# Patient Record
Sex: FEMALE | Race: WHITE | NOT HISPANIC OR LATINO | Employment: UNEMPLOYED | URBAN - METROPOLITAN AREA
[De-identification: names, ages, dates, MRNs, and addresses within clinical notes are randomized per-mention and may not be internally consistent; named-entity substitution may affect disease eponyms.]

---

## 2017-12-04 ENCOUNTER — HOSPITAL ENCOUNTER (OUTPATIENT)
Facility: MEDICAL CENTER | Age: 22
End: 2017-12-04
Attending: OBSTETRICS & GYNECOLOGY | Admitting: OBSTETRICS & GYNECOLOGY
Payer: OTHER GOVERNMENT

## 2017-12-04 VITALS
RESPIRATION RATE: 16 BRPM | TEMPERATURE: 98.2 F | SYSTOLIC BLOOD PRESSURE: 125 MMHG | DIASTOLIC BLOOD PRESSURE: 81 MMHG | HEART RATE: 105 BPM

## 2017-12-04 LAB
APPEARANCE UR: CLEAR
COLOR UR AUTO: YELLOW
GLUCOSE UR QL STRIP.AUTO: NEGATIVE MG/DL
KETONES UR QL STRIP.AUTO: NEGATIVE MG/DL
LEUKOCYTE ESTERASE UR QL STRIP.AUTO: ABNORMAL
NITRITE UR QL STRIP.AUTO: NEGATIVE
PH UR STRIP.AUTO: 6 [PH]
PROT UR QL STRIP: NEGATIVE MG/DL
RBC UR QL AUTO: NEGATIVE
SP GR UR: 1.01

## 2017-12-04 PROCEDURE — 59025 FETAL NON-STRESS TEST: CPT | Performed by: OBSTETRICS & GYNECOLOGY

## 2017-12-04 PROCEDURE — 81002 URINALYSIS NONAUTO W/O SCOPE: CPT

## 2017-12-04 NOTE — PROGRESS NOTES
3726-pt presents from home with c/o cramping today, states that she has had cramps the whole pregnancy, but today they are worse and she has been unable to transfer care from her OB in [place], no c/o leaking, bleeding or other pain, states baby is moving normally, placed on external monitors, vs taken, ua dipped with moderate luekocytes, called her previous office in [place], requested PNR, pt states she is IUGR, pt denies burning with urination, states she has frequency and urgency  1040-TC Dr Torres, report given, no uc's noted on monitors,   1042-Dr Torres here, assessment done, SVE closed, will write Rx for UTI, pt to follow up at [facility], discharge order received  3488-pt discharged home with time for appt at [facility], also gave PTL instructions and Rx for Macrobid, verbalized understanding, left ambulatory on her own

## 2017-12-04 NOTE — PROGRESS NOTES
LABOR AND DELIVERY EVALUATION/CONSULTATION;    Chief complaint;  Lorene Albert is a 22 y.o.  female  at 31 weeks of pregnancy. Presents complaining of lower abdominal crampy pain. Patient is to transfer of care from [facility] patient denies contractions or leakage of fluid. Patient's had 2 previous UTIs. The patient has had  full OB care-she states she is a high-risk pregnancy with IUGR.    Pregnancy complicated by;  Patient Active Problem List    Diagnosis Date Noted   • Postpartum care following vaginal delivery 2015   • ADHD (attention deficit hyperactivity disorder) 2012       Review of systems-denies; fever, chills, shortness of breath, chest pain, abdominal pain    Past medical history-  Past Medical History:   Diagnosis Date   • ADHD (attention deficit hyperactivity disorder) 2012   • Heart murmur    • marijuana use in pregnancy 2015   • Supervision of normal first teen pregnancy 2015       Past Surgical history-  Past Surgical History:   Procedure Laterality Date   • OTHER      Open heart surgery       Present medications- No current facility-administered medications for this encounter.   OB history-  OB History    Para Term  AB Living   1         1   SAB TAB Ectopic Molar Multiple Live Births             1      # Outcome Date GA Lbr Avery/2nd Weight Sex Delivery Anes PTL Lv   1 Term 08/14/15 41w4d  3.056 kg (6 lb 11.8 oz) M Vag-Spont EPI N LINNETTE      Birth Comments: NBS x2 normal, passed NB hearing screen. Received Hep B vaccine at birth        Social history-  Social History     Social History   • Marital status:      Spouse name: N/A   • Number of children: N/A   • Years of education: N/A     Occupational History   • Not on file.     Social History Main Topics   • Smoking status: Never Smoker   • Smokeless tobacco: Not on file   • Alcohol use No   • Drug use: No      Comment: Weed last time used 1/15/2015   • Sexual activity: Yes      Partners: Male     Other Topics Concern   • Not on file     Social History Narrative   • No narrative on file     Allergies-Patient has no known allergies.  Family History- no history of breast or ovarian cancer    Physical examination;  Alert and oriented x3  Gen.-well-developed well-nourished female in no apparent distress  There were no vitals filed for this visit.  Skin-warm and dry  Throat-no erythema or exudates  HEENT-normocephalic,nontraumatic,PERRLA,EOMI  Neck-supple, no masses  Cardiovascular-regular rate rhythm, normal S1-S2, no murmurs  Lungs-clear to auscultation bilaterally  Back-no CVA tenderness  Abdomen-nondistended positive bowel sounds soft nontender without masses or hepatosplenomegaly  Pelvic examination;  Cervix-long and closed  Extremities-no cyanosis clubbing or edema  Neurologic grossly intact    Labs;  No results for input(s): WBC, RBC, HEMOGLOBIN, HEMATOCRIT, MCV, MCH, RDW, PLATELETCT, MPV, NEUTSPOLYS, LYMPHOCYTES, MONOCYTES, EOSINOPHILS, BASOPHILS, RBCMORPHOLO in the last 72 hours.  No results for input(s): SODIUM, POTASSIUM, CHLORIDE, CO2, GLUCOSE, BUN, CPKTOTAL in the last 72 hours.        Urine dip shows moderate leukocyte esterase        NST; as performed and read by myself;reactive NST     Impression;  Intrauterine pregnancy at 41w4d  UTI    Plan;  Macrobid 100 mg by mouth twice a day  Patient needs transfer of care to our OB clinic as soon as possible     labor precautions given    Rock Torres MD

## 2017-12-26 ENCOUNTER — HOSPITAL ENCOUNTER (OUTPATIENT)
Facility: MEDICAL CENTER | Age: 22
End: 2017-12-26
Attending: OBSTETRICS & GYNECOLOGY | Admitting: OBSTETRICS & GYNECOLOGY
Payer: OTHER GOVERNMENT

## 2017-12-26 ENCOUNTER — APPOINTMENT (OUTPATIENT)
Dept: RADIOLOGY | Facility: MEDICAL CENTER | Age: 22
End: 2017-12-26
Attending: STUDENT IN AN ORGANIZED HEALTH CARE EDUCATION/TRAINING PROGRAM
Payer: OTHER GOVERNMENT

## 2017-12-26 VITALS
WEIGHT: 172 LBS | TEMPERATURE: 97.9 F | SYSTOLIC BLOOD PRESSURE: 118 MMHG | BODY MASS INDEX: 29.52 KG/M2 | HEART RATE: 103 BPM | DIASTOLIC BLOOD PRESSURE: 70 MMHG

## 2017-12-26 PROCEDURE — 59025 FETAL NON-STRESS TEST: CPT | Performed by: OBSTETRICS & GYNECOLOGY

## 2017-12-26 PROCEDURE — 76816 OB US FOLLOW-UP PER FETUS: CPT

## 2017-12-26 RX ORDER — ONDANSETRON HYDROCHLORIDE 8 MG/1
8 TABLET, FILM COATED ORAL EVERY 12 HOURS PRN
Qty: 30 TAB | Refills: 1 | Status: SHIPPED | OUTPATIENT
Start: 2017-12-26

## 2017-12-26 NOTE — PROGRESS NOTES
LABOR AND DELIVERY TRIAGE PROGRESS NOTE    PATIENT ID:  NAME:  Lorene Albert  MRN:               [id number]  YOB: 1995     22 y.o. female  at 34w1d per patient's due date. She had most of her prenatal care in [place], moved to [place] about a month ago because her  in the  will be deployed for 9 months soon and patient will be living with her mother. She has an appt scheduled 18 at 0830 with [facility].    Subjective: Pt needed a refill on Zofran and was worried about size of the baby as she was supposed to be receiving U/S every 2 weeks for IUGR.   Pregnancy complicated by IUGR.     negative  For CTXS.   negative Feels pain   negative for LOF  negative for vaginal bleeding.   positive for fetal movement    ROS: Patient denies any fever chills, nausea, vomiting, headache, chest pain, shortness of breath, or dysuria or unusual swelling of hands or feet.     Objective:    Vitals:    17 1100   Weight: 78 kg (172 lb)     No data recorded.    General: No acute distress, resting comfortably in bed.  HEENT: normocephalic, nontraumatic, PERRLA, EOMI  Cardiovascular: Heart RRR with no murmurs, rubs or gallops. Distal Pulses 2+  Respiratory: symmetric chest expansion, lungs CTAB, with no wheezes, rales, rhonci  Abdomen: gravid, nontender  Musculoskeletal: strength 5/5 in four extremities  Neuro: non focal with no numbness, tingling or changes in sensation    Cervix:  deferred  Hersey: Uterine Contractions none  FHRM: Baseline 130,some accels, no decels, moderate variability  Fundal Height: 30cm  Growth U/S consistent with pregnancy at 31w3d (3 weeks behind), SID 6.2cm     Assessment: 22 y.o. female  at 34w1d who is not in labor.  CAT 1 FHT  IUGR with low amniotic fluid (SID 6.2cm)    Plan:   1. Return for NST and SID  @ 0900  2. Rx for PO Zofran 8mg q12  3. Discharge home with return precautions and instructions to follow up with [facility] as scheduled on 18      Discussed case  with Dr. Washburn TPGLORIA Attending. Case was discussed and attending agreed with plan prior to discharge of patient.

## 2017-12-26 NOTE — PROGRESS NOTES
1044) Pt is a  at 34.1 weeks with Prenatal care in [place].  Pt transferred to [place] due to  being in the ,  She has an appointment with [facility] 2018.  Pt states that she has IUGR and wanted to check in on the pregnancy since she has not had care for 1 month.  EFM applied, good fetal movement noted.  Pt denies uc's or leaking of fluid.    1100) Dr Gabriel notified, MD will be in to assess.    1105) Dr Gabriel at bedside, pt assessed, orders received  1140) records obtained from [place].  Pt given prescription for Zofran  1315) ultrasound results reviewed with Dr Baig, strip reviewed by MD.  Order received to schedule pt for NST and SID 2017 @ 0900.    1325) Discussed POC with pt, pt agrees.  Discharge instructions given for  labor and kick counts.  Pt will go home and rest frequently.  Pt verbalizes understanding with questions answered.  stabel discharged to home

## 2017-12-27 ENCOUNTER — HOSPITAL ENCOUNTER (OUTPATIENT)
Facility: MEDICAL CENTER | Age: 22
End: 2017-12-27
Attending: OBSTETRICS & GYNECOLOGY | Admitting: OBSTETRICS & GYNECOLOGY
Payer: OTHER GOVERNMENT

## 2017-12-27 ENCOUNTER — HOSPITAL ENCOUNTER (OUTPATIENT)
Facility: MEDICAL CENTER | Age: 22
End: 2017-12-27
Payer: OTHER GOVERNMENT

## 2017-12-27 VITALS — HEART RATE: 104 BPM | SYSTOLIC BLOOD PRESSURE: 121 MMHG | DIASTOLIC BLOOD PRESSURE: 76 MMHG

## 2017-12-27 LAB — A1 MICROGLOB PLACENTAL VAG QL: NEGATIVE

## 2017-12-27 PROCEDURE — 84112 EVAL AMNIOTIC FLUID PROTEIN: CPT

## 2017-12-27 PROCEDURE — 59025 FETAL NON-STRESS TEST: CPT | Performed by: OBSTETRICS & GYNECOLOGY

## 2017-12-27 NOTE — PROGRESS NOTES
22 y.o.    Edc=2/ 34.2 weeks    TOCO and US on.  VSS.  Pt presents to triage with c/o LOF.  Pt states she was here yesterday and was told her fluid was low after an US was completed.  She states the Dr. Told her that if she noticed any LOF that she should return to the hospital.  Pt denies VB and states pos FM. Pt states she does feel UCs occasionally. Orders to obtain amnisure.  1530-result of amnisure=negative, reactive NST, no UCs seen on monitor.  Discussed findings with Dr. Gant.  Orders to dc pt home with general precautions and follow up instructions for NST on Saturday.  Pt states understanding

## 2017-12-30 ENCOUNTER — HOSPITAL ENCOUNTER (OUTPATIENT)
Facility: MEDICAL CENTER | Age: 22
End: 2017-12-30
Attending: OBSTETRICS & GYNECOLOGY | Admitting: OBSTETRICS & GYNECOLOGY
Payer: OTHER GOVERNMENT

## 2017-12-30 VITALS
DIASTOLIC BLOOD PRESSURE: 76 MMHG | RESPIRATION RATE: 16 BRPM | HEART RATE: 98 BPM | TEMPERATURE: 98.4 F | SYSTOLIC BLOOD PRESSURE: 118 MMHG

## 2017-12-30 PROCEDURE — 59025 FETAL NON-STRESS TEST: CPT | Performed by: OBSTETRICS & GYNECOLOGY

## 2017-12-30 NOTE — PROGRESS NOTES
0810-pt presents from home for scheduled NST for oligo and IUGR, no c/o leaking, bleeding, pain or uc's, states baby is moving normally, placed on external monitors, vs taken,  0820-report given to Dr Wilson, discharge order received when reactive tracing obtained  0835-reactive tracing obtained, pt discharged home with PTL precautions and kick counts, verbalized understanding, left ambulatory with SO

## 2018-01-02 ENCOUNTER — HOSPITAL ENCOUNTER (OUTPATIENT)
Facility: MEDICAL CENTER | Age: 23
End: 2018-01-02
Attending: OBSTETRICS & GYNECOLOGY | Admitting: OBSTETRICS & GYNECOLOGY
Payer: OTHER GOVERNMENT

## 2018-01-02 VITALS — HEART RATE: 122 BPM | DIASTOLIC BLOOD PRESSURE: 74 MMHG | SYSTOLIC BLOOD PRESSURE: 129 MMHG

## 2018-01-02 PROCEDURE — 59025 FETAL NON-STRESS TEST: CPT | Performed by: OBSTETRICS & GYNECOLOGY

## 2018-01-03 NOTE — PROGRESS NOTES
"0 Assumed care of pt    5 RN at bedside, pt stated that she has had a \"couple\" of episodes today where it would take 2-3 hours to feel FM. Pt is currently feeling baby move well. Pt denies LOF or VB. Will update Yenifer IVY on pt status     Yenifer IVY updated, orders for discharge received. Will give pt a \"kick count\" form to start tracking FM. Pt has an appointment on the  of this month.     5 RN at bedside, educated pt on kick count sheet, discussed  labor precautions and when to return to L&D. Pt verbalized understanding.      Pt discharged home in stable condition  "

## 2018-01-05 ENCOUNTER — ROUTINE PRENATAL (OUTPATIENT)
Dept: OBGYN | Facility: CLINIC | Age: 23
End: 2018-01-05
Payer: OTHER GOVERNMENT

## 2018-01-05 ENCOUNTER — HOSPITAL ENCOUNTER (OUTPATIENT)
Facility: MEDICAL CENTER | Age: 23
End: 2018-01-05
Attending: NURSE PRACTITIONER
Payer: OTHER GOVERNMENT

## 2018-01-05 ENCOUNTER — INITIAL PRENATAL (OUTPATIENT)
Dept: OBGYN | Facility: CLINIC | Age: 23
End: 2018-01-05
Payer: OTHER GOVERNMENT

## 2018-01-05 VITALS — SYSTOLIC BLOOD PRESSURE: 104 MMHG | WEIGHT: 184 LBS | DIASTOLIC BLOOD PRESSURE: 70 MMHG | BODY MASS INDEX: 31.58 KG/M2

## 2018-01-05 DIAGNOSIS — O26.843 UTERINE SIZE DATE DISCREPANCY PREGNANCY, THIRD TRIMESTER: ICD-10-CM

## 2018-01-05 DIAGNOSIS — R11.2 NON-INTRACTABLE VOMITING WITH NAUSEA, UNSPECIFIED VOMITING TYPE: ICD-10-CM

## 2018-01-05 DIAGNOSIS — Z34.83 PRENATAL CARE, SUBSEQUENT PREGNANCY IN THIRD TRIMESTER: ICD-10-CM

## 2018-01-05 DIAGNOSIS — O41.03X0 OLIGOHYDRAMNIOS IN THIRD TRIMESTER, SINGLE OR UNSPECIFIED FETUS: ICD-10-CM

## 2018-01-05 LAB
APPEARANCE UR: NORMAL
BILIRUB UR STRIP-MCNC: NORMAL MG/DL
COLOR UR AUTO: NORMAL
GLUCOSE UR STRIP.AUTO-MCNC: NORMAL MG/DL
KETONES UR STRIP.AUTO-MCNC: NORMAL MG/DL
LEUKOCYTE ESTERASE UR QL STRIP.AUTO: NORMAL
NITRITE UR QL STRIP.AUTO: NORMAL
NST ACOUSTIC STIMULATION: NO
NST ACTION NECESSARY: NORMAL
NST ASSESSMENT: REACTIVE
NST BASELINE: NORMAL
NST INDICATIONS: NORMAL
NST OTHER DATA: NORMAL
NST READ BY: NORMAL
NST RETURN: NORMAL
NST UTERINE ACTIVITY: NORMAL
PH UR STRIP.AUTO: 7 [PH] (ref 5–8)
PROT UR QL STRIP: NORMAL MG/DL
RBC UR QL AUTO: NORMAL
SP GR UR STRIP.AUTO: 1.01
UROBILINOGEN UR STRIP-MCNC: NORMAL MG/DL

## 2018-01-05 PROCEDURE — 81002 URINALYSIS NONAUTO W/O SCOPE: CPT | Performed by: NURSE PRACTITIONER

## 2018-01-05 PROCEDURE — 87653 STREP B DNA AMP PROBE: CPT

## 2018-01-05 PROCEDURE — 59025 FETAL NON-STRESS TEST: CPT | Performed by: NURSE PRACTITIONER

## 2018-01-05 PROCEDURE — 59402 PR NEW OB HIGH RISK: CPT | Performed by: NURSE PRACTITIONER

## 2018-01-05 NOTE — PROGRESS NOTES
NST today for Oligohydramnios and possible IUGR     FHR baseline 130-140  Moderate LTV spont acels noted no decels noted  Contractions 2 on whole strip     Reactive NST   Category 1 tracing   RTC Tuesday for NST

## 2018-01-05 NOTE — PROGRESS NOTES
Cc: New OB visit    HPI:  The patient is a 22 y.o.  35w4d based upon U/S and LMP  Patient's last menstrual period was 2017 (exact date).  She presents for her new obstetric visit.  She Reports  fetal movement,  denies  vaginal bleeding,  denies  leakage of fluid,  reports contractions.   She Reports nausea/vomiting, denies headache, denies dysuria, Denies vaginal discharge.  Father of baby involved:yes, family support: yes     Pregnancy complicated by possible IUGR and low fluid volume.   Pt had MFM referral in [place] for possible IUGR   Here at [facility] when came to [place] had U/S in L&D that showed SID of 6.2 and possible growth lag.  On 17.  On 10/17/17 U/S was 24 1/7 days normal fluid and growth DESMOND 18 by this U/S    OB History    Para Term  AB Living   2         1   SAB TAB Ectopic Molar Multiple Live Births             1      # Outcome Date GA Lbr Avery/2nd Weight Sex Delivery Anes PTL Lv   2 Current            1 Term 08/14/15 41w4d  3.056 kg (6 lb 11.8 oz) M Vag-Spont EPI N LINNETTE      Birth Comments: NBS x2 normal, passed NB hearing screen. Received Hep B vaccine at birth        Past Medical History:   Diagnosis Date   • ADHD (attention deficit hyperactivity disorder) 2012   • marijuana use in pregnancy 2015   • Supervision of normal first teen pregnancy 2015     No past surgical history on file.  Social History     Social History   • Marital status:      Spouse name: N/A   • Number of children: N/A   • Years of education: N/A     Occupational History   • Not on file.     Social History Main Topics   • Smoking status: Never Smoker   • Smokeless tobacco: Never Used   • Alcohol use No   • Drug use: No      Comment: Weed last time used 1/15/2015   • Sexual activity: Yes     Partners: Male     Other Topics Concern   • Not on file     Social History Narrative   • No narrative on file     Family History   Problem Relation Age of Onset   • Lung Disease Mother    •  Allergies Mother    • Allergies Father    • Heart Disease Father      CHF   • Hypertension Father    • Hyperlipidemia Father    • Alcohol/Drug Maternal Grandfather    • Stroke Paternal Grandfather    • Cancer Neg Hx    • Diabetes Neg Hx      Allergies:   Allergies as of 2018   • (No Known Allergies)       PE:    Blood pressure 104/70, weight 83.5 kg (184 lb), last menstrual period 2017, currently breastfeeding.    SVE: FT/-2 GBS obtained      see prenatal physical     LABS: see Media for Labs from [place]    A/P:  22 y.o.  35w4d based upon  Patient's last menstrual period was 2017 (exact date)..  She is here for her new obstetric visit.    1. Prenatal care, subsequent pregnancy in third trimester  GRP B STREP, BY PCR (LUNDBERG BROTH)    US-OB LIMITED TRANSABDOMINAL    INDUCTION OF LABOR    REFERRAL TO PERINATOLOGY    POCT Urinalysis   2. Non-intractable vomiting with nausea, unspecified vomiting type  GRP B STREP, BY PCR (LUNDBERG BROTH)    US-OB LIMITED TRANSABDOMINAL    INDUCTION OF LABOR    REFERRAL TO PERINATOLOGY    POCT Urinalysis   3. Uterine size date discrepancy pregnancy, third trimester  GRP B STREP, BY PCR (LUNDBERG BROTH)    US-OB LIMITED TRANSABDOMINAL    INDUCTION OF LABOR    REFERRAL TO PERINATOLOGY    POCT Urinalysis   4. Oligohydramnios in third trimester, single or unspecified fetus  GRP B STREP, BY PCR (LUNDBERG BROTH)    US-OB LIMITED TRANSABDOMINAL    INDUCTION OF LABOR    REFERRAL TO PERINATOLOGY    POCT Urinalysis       1. Ultrasound for growth and fluid  to be scheduled in 1  weeks.  2. Pt has been referred to [facility], per the request of angry mother. Will see sonographer at Dr Starkey office tomorrow but not with Dr Vasquez.  3.  GBS obtained today PNL in the media.  4.  NOB packet given with informational handouts.  5.  Increase water intake and encouraged healthy nutrition.  6.  Discussion of weight gain and proper exercise during pregnancy.  7.   Try to take chewable  prenatal  vitamins.  8.  Follow-up in 1 weeks.   9. Declines  vaccines Flu and Tdap  10. Continue Zofran for N/V that continue to occur  11. NST's 2 x weekly  12. IOL ordered for 39 weeks unless she needs to be delivered before hand.  13. Strong eduction on the processes and tests with the patient and family today

## 2018-01-06 LAB — GP B STREP DNA SPEC QL NAA+PROBE: NEGATIVE

## 2018-01-08 ENCOUNTER — ROUTINE PRENATAL (OUTPATIENT)
Dept: OBGYN | Facility: CLINIC | Age: 23
End: 2018-01-08
Payer: OTHER GOVERNMENT

## 2018-01-08 DIAGNOSIS — O41.03X0 OLIGOHYDRAMNIOS IN THIRD TRIMESTER, SINGLE OR UNSPECIFIED FETUS: Primary | ICD-10-CM

## 2018-01-08 LAB
NST ACOUSTIC STIMULATION: NORMAL
NST ACTION NECESSARY: NORMAL
NST ASSESSMENT: NORMAL
NST BASELINE: 130
NST INDICATIONS: NORMAL
NST OTHER DATA: NORMAL
NST READ BY: NORMAL
NST RETURN: NORMAL
NST UTERINE ACTIVITY: NORMAL

## 2018-01-08 PROCEDURE — 59025 FETAL NON-STRESS TEST: CPT | Performed by: NURSE PRACTITIONER

## 2018-01-12 ENCOUNTER — HOSPITAL ENCOUNTER (OUTPATIENT)
Dept: RADIOLOGY | Facility: MEDICAL CENTER | Age: 23
End: 2018-01-12
Attending: NURSE PRACTITIONER
Payer: OTHER GOVERNMENT

## 2018-01-12 ENCOUNTER — ROUTINE PRENATAL (OUTPATIENT)
Dept: OBGYN | Facility: CLINIC | Age: 23
End: 2018-01-12
Payer: OTHER GOVERNMENT

## 2018-01-12 VITALS — DIASTOLIC BLOOD PRESSURE: 70 MMHG | SYSTOLIC BLOOD PRESSURE: 122 MMHG | BODY MASS INDEX: 31.76 KG/M2 | WEIGHT: 185 LBS

## 2018-01-12 DIAGNOSIS — O26.843 UTERINE SIZE DATE DISCREPANCY PREGNANCY, THIRD TRIMESTER: ICD-10-CM

## 2018-01-12 DIAGNOSIS — O36.5931 IUGR (INTRAUTERINE GROWTH RETARDATION) AFFECTING MOTHER, THIRD TRIMESTER, FETUS 1: ICD-10-CM

## 2018-01-12 DIAGNOSIS — R11.2 NON-INTRACTABLE VOMITING WITH NAUSEA, UNSPECIFIED VOMITING TYPE: ICD-10-CM

## 2018-01-12 DIAGNOSIS — O41.03X0 OLIGOHYDRAMNIOS IN THIRD TRIMESTER, SINGLE OR UNSPECIFIED FETUS: ICD-10-CM

## 2018-01-12 DIAGNOSIS — Z34.83 PRENATAL CARE, SUBSEQUENT PREGNANCY IN THIRD TRIMESTER: ICD-10-CM

## 2018-01-12 LAB
NST ACOUSTIC STIMULATION: NORMAL
NST ACTION NECESSARY: NORMAL
NST ASSESSMENT: REACTIVE
NST BASELINE: 125
NST INDICATIONS: NORMAL
NST OTHER DATA: NORMAL
NST READ BY: NORMAL
NST RETURN: NORMAL
NST UTERINE ACTIVITY: NORMAL

## 2018-01-12 PROCEDURE — 59025 FETAL NON-STRESS TEST: CPT | Performed by: OBSTETRICS & GYNECOLOGY

## 2018-01-12 PROCEDURE — 76815 OB US LIMITED FETUS(S): CPT

## 2018-01-12 PROCEDURE — 90040 PR PRENATAL FOLLOW UP: CPT | Performed by: OBSTETRICS & GYNECOLOGY

## 2018-01-12 NOTE — PROGRESS NOTES
S: Pt presents for routine OB follow up.  Fetal movement: positive  Vaginal Bleeding:negative  Contractions: negative  Loss of Fluid: negative  Questions answered.    Plans to breast feed  Has pre-registered    O: Wt 83.9 kg (185 lb)   LMP 2017 (Exact Date)   BMI 31.76 kg/m²   Patients' weight gain, fluid intake and exercise level discussed.  Vitals, fundal height , fetal position, and FHR reviewed on flowsheet    Lab:  Recent Results (from the past 336 hour(s))   POCT Fetal Nonstress Test    Collection Time: 18  9:03 AM   Result Value Ref Range    NST Indications oligohydramnios, IUGR     NST Baseline 130-140     NST Uterine Activity 2 contractions     NST Acoustic Stimulation no     NST Assessment reactive     NST Action Necessary none     NST Other Data      NST Return Tuesday     NST Read By Kati YODER    GRP B STREP, BY PCR (LUNDBERG BROTH)    Collection Time: 18  9:03 AM   Result Value Ref Range    Strep Gp B DNA PCR Negative Negative   POCT Urinalysis    Collection Time: 18 10:56 AM   Result Value Ref Range    POC Color  Negative    POC Appearance  Negative    POC Leukocyte Esterase large Negative    POC Nitrites neg Negative    POC Urobiligen  Negative (0.2) mg/dL    POC Protein neg Negative mg/dL    POC Urine PH 7.0 5.0 - 8.0    POC Blood neg Negative    POC Specific Gravity 1.015 <1.005 - >1.030    POC Ketones neg Negative mg/dL    POC Biliruben  Negative mg/dL    POC Glucose neg Negative mg/dL   Fetal Nonstress Test    Collection Time: 18  9:43 AM   Result Value Ref Range    NST Indications Oligo     NST Baseline 130     NST Uterine Activity None     NST Acoustic Stimulation None     NST Assessment       Reactive NST cat I FHTs +accels -decels mod variability    NST Action Necessary      NST Other Data      NST Return Cont 2x/wk NST 1wk RALPH     NST Read By [facility]        A/P:  22 y.o.  at 36w4d presents for routine obstetric follow-up.  IUGR  Normal SID  GBS neg    1.   Continue prenatal vitamins.  2.  Fetal kick counts.  3.  Exercise at least 30 minutes daily.  4.  Increase water intake.  5.  Labor precautions educated.  6.  Follow-up in 1 weeks.  7.  Cont twice weekly NSTs  8.  Induction of labor on 1/29/18 at 39wks, referral placed to change from outpt gel to inpt ripening/IOL

## 2018-01-15 ENCOUNTER — ROUTINE PRENATAL (OUTPATIENT)
Dept: OBGYN | Facility: CLINIC | Age: 23
End: 2018-01-15
Payer: OTHER GOVERNMENT

## 2018-01-15 DIAGNOSIS — O36.5930 POOR FETAL GROWTH AFFECTING MANAGEMENT OF MOTHER IN THIRD TRIMESTER, SINGLE OR UNSPECIFIED FETUS: ICD-10-CM

## 2018-01-15 DIAGNOSIS — O36.5931 IUGR (INTRAUTERINE GROWTH RETARDATION) AFFECTING MOTHER, THIRD TRIMESTER, FETUS 1: ICD-10-CM

## 2018-01-15 LAB
NST ACOUSTIC STIMULATION: NO
NST ACTION NECESSARY: NORMAL
NST ASSESSMENT: REACTIVE
NST BASELINE: 130
NST INDICATIONS: NORMAL
NST OTHER DATA: NORMAL
NST READ BY: NORMAL
NST RETURN: NORMAL
NST UTERINE ACTIVITY: NORMAL

## 2018-01-15 PROCEDURE — 59025 FETAL NON-STRESS TEST: CPT | Performed by: OBSTETRICS & GYNECOLOGY

## 2018-01-17 ENCOUNTER — ROUTINE PRENATAL (OUTPATIENT)
Dept: OBGYN | Facility: CLINIC | Age: 23
End: 2018-01-17
Payer: OTHER GOVERNMENT

## 2018-01-17 VITALS — WEIGHT: 187 LBS | SYSTOLIC BLOOD PRESSURE: 126 MMHG | BODY MASS INDEX: 32.1 KG/M2 | DIASTOLIC BLOOD PRESSURE: 84 MMHG

## 2018-01-17 DIAGNOSIS — O36.5931 IUGR (INTRAUTERINE GROWTH RETARDATION) AFFECTING MOTHER, THIRD TRIMESTER, FETUS 1: ICD-10-CM

## 2018-01-17 DIAGNOSIS — O36.5930 POOR FETAL GROWTH AFFECTING MANAGEMENT OF MOTHER IN THIRD TRIMESTER, SINGLE OR UNSPECIFIED FETUS: ICD-10-CM

## 2018-01-17 LAB
NST ACOUSTIC STIMULATION: NORMAL
NST ACTION NECESSARY: NORMAL
NST ASSESSMENT: NORMAL
NST BASELINE: NORMAL
NST INDICATIONS: NORMAL
NST OTHER DATA: NORMAL
NST READ BY: NORMAL
NST RETURN: NORMAL
NST UTERINE ACTIVITY: NORMAL

## 2018-01-17 PROCEDURE — 90040 PR PRENATAL FOLLOW UP: CPT | Performed by: PHYSICIAN ASSISTANT

## 2018-01-17 PROCEDURE — 59025 FETAL NON-STRESS TEST: CPT | Performed by: PHYSICIAN ASSISTANT

## 2018-01-17 NOTE — PROGRESS NOTES
Pt has no complaints with cramping, UCs, Vb, LOF, though pt wants to be checked today. +FM. NST Cat 1 for IUGR. IOL scheduled at 39wk, 1/29. Cervix: Ft/75/-2, post, med, vtx. Daily FKC and walks recommended. RTC 1 wk or sooner prn.

## 2018-01-18 ENCOUNTER — DATING (OUTPATIENT)
Dept: OBGYN | Facility: CLINIC | Age: 23
End: 2018-01-18

## 2018-01-23 ENCOUNTER — ROUTINE PRENATAL (OUTPATIENT)
Dept: OBGYN | Facility: CLINIC | Age: 23
End: 2018-01-23
Payer: OTHER GOVERNMENT

## 2018-01-23 VITALS — BODY MASS INDEX: 32.27 KG/M2 | DIASTOLIC BLOOD PRESSURE: 60 MMHG | SYSTOLIC BLOOD PRESSURE: 122 MMHG | WEIGHT: 188 LBS

## 2018-01-23 DIAGNOSIS — O36.5931 IUGR (INTRAUTERINE GROWTH RETARDATION) AFFECTING MOTHER, THIRD TRIMESTER, FETUS 1: ICD-10-CM

## 2018-01-23 LAB
NST ACOUSTIC STIMULATION: NO
NST ACTION NECESSARY: NORMAL
NST ASSESSMENT: REACTIVE
NST BASELINE: 130
NST INDICATIONS: NORMAL
NST OTHER DATA: NORMAL
NST READ BY: NORMAL
NST RETURN: NORMAL
NST UTERINE ACTIVITY: NO

## 2018-01-23 PROCEDURE — 90040 PR PRENATAL FOLLOW UP: CPT | Performed by: OBSTETRICS & GYNECOLOGY

## 2018-01-23 PROCEDURE — 59025 FETAL NON-STRESS TEST: CPT | Performed by: OBSTETRICS & GYNECOLOGY

## 2018-01-23 NOTE — PROGRESS NOTES
Lorene Albert is a 22 y.o.  at 38w1d here today for obstetrical visit.  Patient is without complaints.    She reports good fetal movement.  She denies vaginal bleeding.  She denies rupture of membranes.  She denies contractions.     has ADHD (attention deficit hyperactivity disorder); Prenatal care, subsequent pregnancy in third trimester; Non-intractable vomiting with nausea; Uterine size date discrepancy pregnancy, third trimester; and IUGR (intrauterine growth retardation) affecting mother, third trimester, fetus 1 on her problem list.    Patient with IUGR, induction of labor scheduled for   Patient has follow-up with perinatology today  NST on Friday    A/P IUP at 38w1d  AFP done  1 hour glucola done  Rhogam done  GBS done    F/U in prn weeks

## 2018-01-25 ENCOUNTER — ROUTINE PRENATAL (OUTPATIENT)
Dept: OBGYN | Facility: CLINIC | Age: 23
End: 2018-01-25
Payer: OTHER GOVERNMENT

## 2018-01-25 DIAGNOSIS — O36.5931 IUGR (INTRAUTERINE GROWTH RETARDATION) AFFECTING MOTHER, THIRD TRIMESTER, FETUS 1: ICD-10-CM

## 2018-01-25 PROCEDURE — 59025 FETAL NON-STRESS TEST: CPT | Performed by: OBSTETRICS & GYNECOLOGY

## 2018-01-25 RX ORDER — ONDANSETRON HYDROCHLORIDE 8 MG/1
8 TABLET, FILM COATED ORAL EVERY 8 HOURS PRN
Qty: 30 TAB | Refills: 0 | Status: SHIPPED | OUTPATIENT
Start: 2018-01-25

## 2018-01-28 ENCOUNTER — HOSPITAL ENCOUNTER (INPATIENT)
Facility: MEDICAL CENTER | Age: 23
LOS: 3 days | End: 2018-01-31
Attending: OBSTETRICS & GYNECOLOGY | Admitting: OBSTETRICS & GYNECOLOGY
Payer: OTHER GOVERNMENT

## 2018-01-28 LAB
BASOPHILS # BLD AUTO: 0.3 % (ref 0–1.8)
BASOPHILS # BLD: 0.04 K/UL (ref 0–0.12)
EOSINOPHIL # BLD AUTO: 0.18 K/UL (ref 0–0.51)
EOSINOPHIL NFR BLD: 1.5 % (ref 0–6.9)
ERYTHROCYTE [DISTWIDTH] IN BLOOD BY AUTOMATED COUNT: 41.5 FL (ref 35.9–50)
HCT VFR BLD AUTO: 36.9 % (ref 37–47)
HGB BLD-MCNC: 12.2 G/DL (ref 12–16)
HOLDING TUBE BB 8507: NORMAL
IMM GRANULOCYTES # BLD AUTO: 0.08 K/UL (ref 0–0.11)
IMM GRANULOCYTES NFR BLD AUTO: 0.6 % (ref 0–0.9)
LYMPHOCYTES # BLD AUTO: 2.35 K/UL (ref 1–4.8)
LYMPHOCYTES NFR BLD: 19 % (ref 22–41)
MCH RBC QN AUTO: 28 PG (ref 27–33)
MCHC RBC AUTO-ENTMCNC: 33.1 G/DL (ref 33.6–35)
MCV RBC AUTO: 84.8 FL (ref 81.4–97.8)
MONOCYTES # BLD AUTO: 1.37 K/UL (ref 0–0.85)
MONOCYTES NFR BLD AUTO: 11.1 % (ref 0–13.4)
NEUTROPHILS # BLD AUTO: 8.32 K/UL (ref 2–7.15)
NEUTROPHILS NFR BLD: 67.5 % (ref 44–72)
NRBC # BLD AUTO: 0 K/UL
NRBC BLD-RTO: 0 /100 WBC
PLATELET # BLD AUTO: 269 K/UL (ref 164–446)
PMV BLD AUTO: 11.2 FL (ref 9–12.9)
RBC # BLD AUTO: 4.35 M/UL (ref 4.2–5.4)
WBC # BLD AUTO: 12.3 K/UL (ref 4.8–10.8)

## 2018-01-28 PROCEDURE — 700105 HCHG RX REV CODE 258

## 2018-01-28 PROCEDURE — 85025 COMPLETE CBC W/AUTO DIFF WBC: CPT

## 2018-01-28 PROCEDURE — 770002 HCHG ROOM/CARE - OB PRIVATE (112)

## 2018-01-28 RX ORDER — CITRIC ACID/SODIUM CITRATE 334-500MG
30 SOLUTION, ORAL ORAL EVERY 6 HOURS PRN
Status: DISCONTINUED | OUTPATIENT
Start: 2018-01-28 | End: 2018-01-29 | Stop reason: HOSPADM

## 2018-01-28 RX ORDER — OXYTOCIN 10 [USP'U]/ML
10 INJECTION, SOLUTION INTRAMUSCULAR; INTRAVENOUS
Status: DISCONTINUED | OUTPATIENT
Start: 2018-01-28 | End: 2018-01-29 | Stop reason: HOSPADM

## 2018-01-28 RX ORDER — SODIUM CHLORIDE, SODIUM LACTATE, POTASSIUM CHLORIDE, CALCIUM CHLORIDE 600; 310; 30; 20 MG/100ML; MG/100ML; MG/100ML; MG/100ML
INJECTION, SOLUTION INTRAVENOUS
Status: COMPLETED
Start: 2018-01-28 | End: 2018-01-29

## 2018-01-28 RX ORDER — HYDROXYZINE 50 MG/1
50 TABLET, FILM COATED ORAL EVERY 6 HOURS PRN
Status: DISCONTINUED | OUTPATIENT
Start: 2018-01-28 | End: 2018-01-29 | Stop reason: HOSPADM

## 2018-01-28 RX ORDER — ALUMINA, MAGNESIA, AND SIMETHICONE 2400; 2400; 240 MG/30ML; MG/30ML; MG/30ML
30 SUSPENSION ORAL EVERY 6 HOURS PRN
Status: DISCONTINUED | OUTPATIENT
Start: 2018-01-28 | End: 2018-01-29 | Stop reason: HOSPADM

## 2018-01-28 RX ADMIN — SODIUM CHLORIDE, POTASSIUM CHLORIDE, SODIUM LACTATE AND CALCIUM CHLORIDE 1000 ML: 600; 310; 30; 20 INJECTION, SOLUTION INTRAVENOUS at 23:00

## 2018-01-28 ASSESSMENT — PATIENT HEALTH QUESTIONNAIRE - PHQ9
2. FEELING DOWN, DEPRESSED, IRRITABLE, OR HOPELESS: NOT AT ALL
SUM OF ALL RESPONSES TO PHQ9 QUESTIONS 1 AND 2: 0
1. LITTLE INTEREST OR PLEASURE IN DOING THINGS: NOT AT ALL
SUM OF ALL RESPONSES TO PHQ QUESTIONS 1-9: 0

## 2018-01-28 ASSESSMENT — LIFESTYLE VARIABLES: DO YOU DRINK ALCOHOL: NO

## 2018-01-28 ASSESSMENT — PAIN SCALES - GENERAL
PAINLEVEL_OUTOF10: 0
PAINLEVEL_OUTOF10: 0

## 2018-01-29 LAB
ACTION RH IMMUNE GLOB 8505RHG: NORMAL
ERYTHROCYTE [DISTWIDTH] IN BLOOD BY AUTOMATED COUNT: 41.8 FL (ref 35.9–50)
HCT VFR BLD AUTO: 33.2 % (ref 37–47)
HGB BLD-MCNC: 10.9 G/DL (ref 12–16)
IMMUNE ROSETTING TEST 8505FMH: NORMAL
MCH RBC QN AUTO: 28.1 PG (ref 27–33)
MCHC RBC AUTO-ENTMCNC: 32.8 G/DL (ref 33.6–35)
MCV RBC AUTO: 85.6 FL (ref 81.4–97.8)
NUMBER OF RH DOSES IND 8505RD: 1
PLATELET # BLD AUTO: 226 K/UL (ref 164–446)
PMV BLD AUTO: 10.7 FL (ref 9–12.9)
RBC # BLD AUTO: 3.88 M/UL (ref 4.2–5.4)
WBC # BLD AUTO: 14.1 K/UL (ref 4.8–10.8)

## 2018-01-29 PROCEDURE — 0HQ9XZZ REPAIR PERINEUM SKIN, EXTERNAL APPROACH: ICD-10-PCS | Performed by: OBSTETRICS & GYNECOLOGY

## 2018-01-29 PROCEDURE — 303615 HCHG EPIDURAL/SPINAL ANESTHESIA FOR LABOR

## 2018-01-29 PROCEDURE — 700102 HCHG RX REV CODE 250 W/ 637 OVERRIDE(OP): Performed by: OBSTETRICS & GYNECOLOGY

## 2018-01-29 PROCEDURE — 85461 HEMOGLOBIN FETAL: CPT

## 2018-01-29 PROCEDURE — 3E033VJ INTRODUCTION OF OTHER HORMONE INTO PERIPHERAL VEIN, PERCUTANEOUS APPROACH: ICD-10-PCS | Performed by: OBSTETRICS & GYNECOLOGY

## 2018-01-29 PROCEDURE — 3E0P7VZ INTRODUCTION OF HORMONE INTO FEMALE REPRODUCTIVE, VIA NATURAL OR ARTIFICIAL OPENING: ICD-10-PCS | Performed by: OBSTETRICS & GYNECOLOGY

## 2018-01-29 PROCEDURE — 85027 COMPLETE CBC AUTOMATED: CPT

## 2018-01-29 PROCEDURE — 4A1HXCZ MONITORING OF PRODUCTS OF CONCEPTION, CARDIAC RATE, EXTERNAL APPROACH: ICD-10-PCS | Performed by: OBSTETRICS & GYNECOLOGY

## 2018-01-29 PROCEDURE — A9270 NON-COVERED ITEM OR SERVICE: HCPCS | Performed by: OBSTETRICS & GYNECOLOGY

## 2018-01-29 PROCEDURE — 59409 OBSTETRICAL CARE: CPT

## 2018-01-29 PROCEDURE — 700111 HCHG RX REV CODE 636 W/ 250 OVERRIDE (IP)

## 2018-01-29 PROCEDURE — 36415 COLL VENOUS BLD VENIPUNCTURE: CPT

## 2018-01-29 PROCEDURE — 770002 HCHG ROOM/CARE - OB PRIVATE (112)

## 2018-01-29 PROCEDURE — 304965 HCHG RECOVERY SERVICES

## 2018-01-29 PROCEDURE — 700111 HCHG RX REV CODE 636 W/ 250 OVERRIDE (IP): Performed by: OBSTETRICS & GYNECOLOGY

## 2018-01-29 PROCEDURE — 700105 HCHG RX REV CODE 258

## 2018-01-29 PROCEDURE — 700101 HCHG RX REV CODE 250

## 2018-01-29 PROCEDURE — 700101 HCHG RX REV CODE 250: Performed by: OBSTETRICS & GYNECOLOGY

## 2018-01-29 RX ORDER — BISACODYL 10 MG
10 SUPPOSITORY, RECTAL RECTAL PRN
Status: DISCONTINUED | OUTPATIENT
Start: 2018-01-29 | End: 2018-01-31 | Stop reason: HOSPADM

## 2018-01-29 RX ORDER — ONDANSETRON 2 MG/ML
4 INJECTION INTRAMUSCULAR; INTRAVENOUS EVERY 6 HOURS PRN
Status: DISCONTINUED | OUTPATIENT
Start: 2018-01-29 | End: 2018-01-31 | Stop reason: HOSPADM

## 2018-01-29 RX ORDER — IBUPROFEN 800 MG/1
800 TABLET ORAL EVERY 8 HOURS PRN
Status: DISCONTINUED | OUTPATIENT
Start: 2018-01-29 | End: 2018-01-31 | Stop reason: HOSPADM

## 2018-01-29 RX ORDER — ONDANSETRON 2 MG/ML
INJECTION INTRAMUSCULAR; INTRAVENOUS
Status: COMPLETED
Start: 2018-01-29 | End: 2018-01-29

## 2018-01-29 RX ORDER — ACETAMINOPHEN 325 MG/1
325 TABLET ORAL EVERY 4 HOURS PRN
Status: DISCONTINUED | OUTPATIENT
Start: 2018-01-29 | End: 2018-01-31 | Stop reason: HOSPADM

## 2018-01-29 RX ORDER — ROPIVACAINE HYDROCHLORIDE 2 MG/ML
INJECTION, SOLUTION EPIDURAL; INFILTRATION; PERINEURAL
Status: COMPLETED
Start: 2018-01-29 | End: 2018-01-29

## 2018-01-29 RX ORDER — BUPIVACAINE HYDROCHLORIDE 2.5 MG/ML
INJECTION, SOLUTION EPIDURAL; INFILTRATION; INTRACAUDAL
Status: ACTIVE
Start: 2018-01-29 | End: 2018-01-29

## 2018-01-29 RX ORDER — ONDANSETRON 4 MG/1
4 TABLET, ORALLY DISINTEGRATING ORAL EVERY 6 HOURS PRN
Status: DISCONTINUED | OUTPATIENT
Start: 2018-01-29 | End: 2018-01-31 | Stop reason: HOSPADM

## 2018-01-29 RX ORDER — SODIUM CHLORIDE, SODIUM LACTATE, POTASSIUM CHLORIDE, CALCIUM CHLORIDE 600; 310; 30; 20 MG/100ML; MG/100ML; MG/100ML; MG/100ML
INJECTION, SOLUTION INTRAVENOUS
Status: COMPLETED
Start: 2018-01-29 | End: 2018-01-29

## 2018-01-29 RX ORDER — DOCUSATE SODIUM 100 MG/1
100 CAPSULE, LIQUID FILLED ORAL 2 TIMES DAILY PRN
Status: DISCONTINUED | OUTPATIENT
Start: 2018-01-29 | End: 2018-01-31 | Stop reason: HOSPADM

## 2018-01-29 RX ORDER — VITAMIN A ACETATE, BETA CAROTENE, ASCORBIC ACID, CHOLECALCIFEROL, .ALPHA.-TOCOPHEROL ACETATE, DL-, THIAMINE MONONITRATE, RIBOFLAVIN, NIACINAMIDE, PYRIDOXINE HYDROCHLORIDE, FOLIC ACID, CYANOCOBALAMIN, CALCIUM CARBONATE, FERROUS FUMARATE, ZINC OXIDE, CUPRIC OXIDE 3080; 12; 120; 400; 1; 1.84; 3; 20; 22; 920; 25; 200; 27; 10; 2 [IU]/1; UG/1; MG/1; [IU]/1; MG/1; MG/1; MG/1; MG/1; MG/1; [IU]/1; MG/1; MG/1; MG/1; MG/1; MG/1
1 TABLET, FILM COATED ORAL EVERY MORNING
Status: DISCONTINUED | OUTPATIENT
Start: 2018-01-29 | End: 2018-01-31 | Stop reason: HOSPADM

## 2018-01-29 RX ORDER — OXYCODONE HYDROCHLORIDE 5 MG/1
5 TABLET ORAL EVERY 4 HOURS PRN
Status: DISCONTINUED | OUTPATIENT
Start: 2018-01-29 | End: 2018-01-31 | Stop reason: HOSPADM

## 2018-01-29 RX ORDER — OXYCODONE HYDROCHLORIDE 10 MG/1
10 TABLET ORAL EVERY 4 HOURS PRN
Status: DISCONTINUED | OUTPATIENT
Start: 2018-01-29 | End: 2018-01-31 | Stop reason: HOSPADM

## 2018-01-29 RX ORDER — SODIUM CHLORIDE, SODIUM LACTATE, POTASSIUM CHLORIDE, CALCIUM CHLORIDE 600; 310; 30; 20 MG/100ML; MG/100ML; MG/100ML; MG/100ML
INJECTION, SOLUTION INTRAVENOUS PRN
Status: DISCONTINUED | OUTPATIENT
Start: 2018-01-29 | End: 2018-01-31 | Stop reason: HOSPADM

## 2018-01-29 RX ADMIN — ONDANSETRON 4 MG: 2 INJECTION INTRAMUSCULAR; INTRAVENOUS at 08:15

## 2018-01-29 RX ADMIN — SODIUM CHLORIDE, POTASSIUM CHLORIDE, SODIUM LACTATE AND CALCIUM CHLORIDE 1000 ML: 600; 310; 30; 20 INJECTION, SOLUTION INTRAVENOUS at 00:15

## 2018-01-29 RX ADMIN — FENTANYL CITRATE 100 MCG: 50 INJECTION INTRAMUSCULAR; INTRAVENOUS at 04:59

## 2018-01-29 RX ADMIN — Medication 1 MILLI-UNITS/MIN: at 06:09

## 2018-01-29 RX ADMIN — Medication 125 ML/HR: at 08:14

## 2018-01-29 RX ADMIN — SODIUM CHLORIDE, POTASSIUM CHLORIDE, SODIUM LACTATE AND CALCIUM CHLORIDE 1000 ML: 600; 310; 30; 20 INJECTION, SOLUTION INTRAVENOUS at 02:31

## 2018-01-29 RX ADMIN — Medication 1 TABLET: at 11:47

## 2018-01-29 RX ADMIN — DINOPROSTONE 5 MG: 20 SUPPOSITORY VAGINAL at 00:51

## 2018-01-29 RX ADMIN — IBUPROFEN 800 MG: 800 TABLET, FILM COATED ORAL at 21:31

## 2018-01-29 RX ADMIN — SODIUM CHLORIDE, POTASSIUM CHLORIDE, SODIUM LACTATE AND CALCIUM CHLORIDE 1000 ML: 600; 310; 30; 20 INJECTION, SOLUTION INTRAVENOUS at 06:10

## 2018-01-29 RX ADMIN — IBUPROFEN 800 MG: 800 TABLET, FILM COATED ORAL at 11:48

## 2018-01-29 RX ADMIN — Medication 999 MILLI-UNITS/MIN: at 07:00

## 2018-01-29 ASSESSMENT — PAIN SCALES - GENERAL
PAINLEVEL_OUTOF10: 0
PAINLEVEL_OUTOF10: 0
PAINLEVEL_OUTOF10: 4
PAINLEVEL_OUTOF10: 0
PAINLEVEL_OUTOF10: 6

## 2018-01-29 ASSESSMENT — LIFESTYLE VARIABLES
EVER_SMOKED: NEVER
ALCOHOL_USE: NO

## 2018-01-29 NOTE — PROGRESS NOTES
0710 Report rcvd from Fernanda and Elke RNs, at bedside. POC discussed, pt care assumed. Pt found to be recently delivered and holding baby. No pain reported at this time.

## 2018-01-29 NOTE — H&P
History and Physical      Lorene Albert is a 22 y.o. year old female  at 38w6d who presents for IOL due to IUGR    Subjective:   negative  For CTXS.   negative Feels pain   negative for LOF  negative for vaginal bleeding.   positive for fetal movement    ROS: Pertinent items are noted in HPI.    Past Medical History:   Diagnosis Date   • ADHD (attention deficit hyperactivity disorder) 2012   • marijuana use in pregnancy 2015   • Supervision of normal first teen pregnancy 2015     No past surgical history on file.  OB History    Para Term  AB Living   2 1 1     1   SAB TAB Ectopic Molar Multiple Live Births             1      # Outcome Date GA Lbr Avery/2nd Weight Sex Delivery Anes PTL Lv   2 Current            1 Term 08/14/15 41w4d  3.056 kg (6 lb 11.8 oz) M Vag-Spont EPI N LINNETTE      Birth Comments: NBS x2 normal, passed [place] hearing screen. Received Hep B vaccine at birth        Social History     Social History   • Marital status:      Spouse name: N/A   • Number of children: N/A   • Years of education: N/A     Occupational History   • Not on file.     Social History Main Topics   • Smoking status: Never Smoker   • Smokeless tobacco: Never Used   • Alcohol use No   • Drug use: No      Comment: Weed last time used 1/15/2015   • Sexual activity: Yes     Partners: Male     Other Topics Concern   • Not on file     Social History Narrative   • No narrative on file     Allergies: Patient has no known allergies.    Current Facility-Administered Medications:   •  LACTATED RINGERS IV SOLN, , , ,     Prenatal care with [facility] starting at 35 wks with following problems:  Patient Active Problem List    Diagnosis Date Noted   • IUGR (intrauterine growth retardation) affecting mother, third trimester, fetus 1 2018   • Prenatal care, subsequent pregnancy in third trimester 2018   • Non-intractable vomiting with nausea 2018   • Uterine size date discrepancy pregnancy, third  "trimester 01/05/2018   • ADHD (attention deficit hyperactivity disorder) 04/27/2012               Objective:      Height 1.626 m (5' 4\"), weight 83.9 kg (185 lb), last menstrual period 05/01/2017, currently breastfeeding.    General:   no acute distress   Skin:   normal   HEENT:  PERRLA   Lungs:   CTA bilateral   Heart:   S1, S2 normal, no murmur, click, rub or gallop, regular rate and rhythm, brisk carotid upstroke without bruits, peripheral pulses very brisk, chest is clear without rales or wheezing, no pedal edema, no JVD, no hepatosplenomegaly   Abdomen:   gravid, NT   EFW:  2000 grams   Pelvis:  adequate with gynecoid pelvis   FHT:  140s BPM, moderate variability, followed by spontaneous decel to 60s lasting 5 minutes with slow recovery to baseline   Uterine Size: S<D   Presentations: Cephalic   Cervix:     Dilation: Closed    Effacement: 25%    Station:  Floating    Consistency: Firm    Position: Middle     Lab Review  Lab:   Blood type: O     Recent Results (from the past 5880 hour(s))   POC UA    Collection Time: 12/04/17 10:11 AM   Result Value Ref Range    POC Color Yellow     POC Appearance Clear     POC Glucose Negative Negative mg/dL    POC Ketones Negative Negative mg/dL    POC Specific Gravity 1.015 1.005 - 1.030    POC Blood Negative Negative    POC Urine PH 6.0 5.0 - 8.0    POC Protein Negative Negative mg/dL    POC Nitrites Negative Negative    POC Leukocyte Esterase Moderate (A) Negative   AMNISURE ROM ASSAY    Collection Time: 12/27/17  2:50 PM   Result Value Ref Range    AmniSure ROM Negative Negative   POCT Fetal Nonstress Test    Collection Time: 01/05/18  9:03 AM   Result Value Ref Range    NST Indications oligohydramnios, IUGR     NST Baseline 130-140     NST Uterine Activity 2 contractions     NST Acoustic Stimulation no     NST Assessment reactive     NST Action Necessary none     NST Other Data      NST Return Tuesday     NST Read By Kati YODER    GRP B STREP, BY PCR (LUNDBERG BROTH)    " Collection Time: 01/05/18  9:03 AM   Result Value Ref Range    Strep Gp B DNA PCR Negative Negative   POCT Urinalysis    Collection Time: 01/05/18 10:56 AM   Result Value Ref Range    POC Color  Negative    POC Appearance  Negative    POC Leukocyte Esterase large Negative    POC Nitrites neg Negative    POC Urobiligen  Negative (0.2) mg/dL    POC Protein neg Negative mg/dL    POC Urine PH 7.0 5.0 - 8.0    POC Blood neg Negative    POC Specific Gravity 1.015 <1.005 - >1.030    POC Ketones neg Negative mg/dL    POC Biliruben  Negative mg/dL    POC Glucose neg Negative mg/dL   Fetal Nonstress Test    Collection Time: 01/08/18  9:43 AM   Result Value Ref Range    NST Indications Oligo     NST Baseline 130     NST Uterine Activity None     NST Acoustic Stimulation None     NST Assessment       Reactive NST cat I FHTs +accels -decels mod variability    NST Action Necessary      NST Other Data      NST Return Cont 2x/wk NST 1wk RALPH     NST Read [place] [facility]    POCT Fetal Nonstress Test    Collection Time: 01/12/18  1:30 PM   Result Value Ref Range    NST Indications IUGR     NST Baseline 125     NST Uterine Activity none     NST Acoustic Stimulation n/a     NST Assessment reactive     NST Action Necessary f/u 3-4d     NST Other Data      NST Return      NST Read By     POCT Fetal Nonstress Test    Collection Time: 01/15/18  1:18 PM   Result Value Ref Range    NST Indications IUGR     NST Baseline 130     NST Uterine Activity none     NST Acoustic Stimulation no     NST Assessment reactive     NST Action Necessary none     NST Other Data      NST Return      NST Read By     POCT Fetal Nonstress Test    Collection Time: 01/17/18 10:32 AM   Result Value Ref Range    NST Indications IUGR     NST Baseline 120bpm     NST Uterine Activity None     NST Acoustic Stimulation None     NST Assessment Cat 1     NST Action Necessary      NST Other Data IOL scheduled 1/29     NST Return 2x/wk until delivery     NST Read By [facility]    POCT  Fetal Nonstress Test    Collection Time: 18 10:40 AM   Result Value Ref Range    NST Indications IUGR     NST Baseline 130     NST Uterine Activity no     NST Acoustic Stimulation no     NST Assessment reactive     NST Action Necessary      NST Other Data      NST Return      NST Read By     POCT Fetal Nonstress Test    Collection Time: 18 11:00 AM   Result Value Ref Range    NST Indications IUGR     NST Baseline 125     NST Uterine Activity none     NST Acoustic Stimulation n/a     NST Assessment reactive     NST Action Necessary f/u 3-4d     NST Other Data      NST Return      NST Read By          Assessment:   Lorene Albert at 38w6d  Labor status: Not in labor, cervix unfavorable.    Obstetrical history significant for   Patient Active Problem List    Diagnosis Date Noted   • IUGR (intrauterine growth retardation) affecting mother, third trimester, fetus 1 2018   • Prenatal care, subsequent pregnancy in third trimester 2018   • Non-intractable vomiting with nausea 2018   • Uterine size date discrepancy pregnancy, third trimester 2018   • ADHD (attention deficit hyperactivity disorder) 2012   .      Plan:     Admit to L&D  GBS negative  Plan to ripen cervix with prostaglandin gel.  Advised that with spontaneous decel, she is at risk for  for fetal intolerance of labor.

## 2018-01-29 NOTE — PROGRESS NOTES
Dr. Shen updated on fetal strip. Minimal variability noted after removing oxygen. Orders to place prostaglandin gel received.

## 2018-01-29 NOTE — PROGRESS NOTES
EDC 2018 38.6 wks for IOL for IUGR.  2230_ EFM and Dalhart applied. VS completed and WDL. POC reviewed and understanding verbalized  2250_ Dr Shen @ beside to evaluate pt.  0416_ Dr Shen notified by phone of SROM @ 8064 with [facility]. Orders received to start pit @ 0600.  0600_ Dr Shen updated on pt's status, pt requesting epidural. Orders received pt ok to get epidural.  0620_ Dr Wolf @ bedside for epidural.  0649_ Pt feels urge to push, SVE complete/100/+2. Room set up for deliver, dr Condon called. Pt states urge to push.  0651_  viable female apgars 8/9 by AIDEE Romero.   0652_ Dr Shen in room.  0655_ Placenta delivered spontaneously   0715_ Report given to ALLIE Walters RN      Maternal heart tones tracing during epidural placing. Attempted to get fetal heart tones after epidural placing but pt extremely uncomfortable.

## 2018-01-29 NOTE — DELIVERY NOTE
DELIVERY NOTE - Normal Spontaneous Vaginal Delivery    21 yo  @ 38+6/7 wks admitted yesterday for IOL due to IUGR.  Pt received one dose of PG gel, then pitocin and progressed to complete/complete/+2 station.  Called by nurse to attend delivery.  Infant delivered precipitously in bed by nurse upon my arrival.    Viable female infant delivered over first degree laceration with apgars of 8 and 9, weight pending with epidural anesthesia. Infant placed on maternal abdomen. Delayed cord clamping performed when cord stopped pulsating.  Cord cut.  Cord gases sent.  Infant moved to warmer for suctioning of meconium.  Placenta delivered spontaneously, was 3 vessel and intact.  Cervix and perineum inspected.  1st degree laceration repaired with 3-0 Chromic in usual fashion.  Hemostasis obtained.   cc.   20 units of Pitocin in  1000ml LR administered IV after delivery.  There were no complications.  Mother and infant in stable condition in room.

## 2018-01-29 NOTE — CARE PLAN
Problem: Infection  Goal: Will remain free from infection  Outcome: PROGRESSING AS EXPECTED  Pt is afebrile, GBS negative and no s/s of infection    Problem: Knowledge Deficit  Goal: Patient/Support person demonstrates understanding regarding the progression of labor, available options and participates in decision-making process  Outcome: PROGRESSING AS EXPECTED  POC reviewed with pt and significant other and understanding verbalized

## 2018-01-30 PROCEDURE — A9270 NON-COVERED ITEM OR SERVICE: HCPCS | Performed by: OBSTETRICS & GYNECOLOGY

## 2018-01-30 PROCEDURE — 700102 HCHG RX REV CODE 250 W/ 637 OVERRIDE(OP): Performed by: OBSTETRICS & GYNECOLOGY

## 2018-01-30 PROCEDURE — 770002 HCHG ROOM/CARE - OB PRIVATE (112)

## 2018-01-30 RX ORDER — PSEUDOEPHEDRINE HCL 30 MG
100 TABLET ORAL 2 TIMES DAILY PRN
Qty: 60 CAP | Refills: 3 | Status: SHIPPED | OUTPATIENT
Start: 2018-01-30

## 2018-01-30 RX ORDER — IBUPROFEN 800 MG/1
800 TABLET ORAL EVERY 8 HOURS PRN
Qty: 30 TAB | Refills: 1 | Status: SHIPPED | OUTPATIENT
Start: 2018-01-30

## 2018-01-30 RX ORDER — LANOLIN ALCOHOL/MO/W.PET/CERES
325 CREAM (GRAM) TOPICAL
Qty: 90 TAB | Refills: 3 | Status: SHIPPED | OUTPATIENT
Start: 2018-01-30

## 2018-01-30 RX ADMIN — IBUPROFEN 800 MG: 800 TABLET, FILM COATED ORAL at 15:44

## 2018-01-30 RX ADMIN — Medication 1 TABLET: at 08:57

## 2018-01-30 ASSESSMENT — PAIN SCALES - GENERAL
PAINLEVEL_OUTOF10: 0
PAINLEVEL_OUTOF10: 3

## 2018-01-30 NOTE — CARE PLAN
Problem: Potential for postpartum infection related to presence of episiotomy/vaginal tear and/or uterine contamination  Goal: Patient will be absent from signs and symptoms of infection  Outcome: PROGRESSING AS EXPECTED      Problem: Alteration in comfort related to episiotomy, vaginal repair and/or after birth pains  Goal: Patient is able to ambulate, care for self and infant  Outcome: PROGRESSING AS EXPECTED  Pt able to ambulate, care for self and infant. Pt states her pain is controlled with prn pain medication. Pt's pain reassessed throughout this shift.

## 2018-01-30 NOTE — PROGRESS NOTES
Assessment completed. WDL. Vital signs stable. Patient progressing according to plan of care. Patient up and ambulating. Pt states she is voiding without difficulty. Patient claims to have good pain relief with prn pain medications. Pt states she will call when she needs prn pain medication. Pt denies any other issues at this time. Educated pt about feeding infant every 3 hours or when infant is showing cues and pump. Pt encouraged to call for next feed to assess latch. Pt encouraged to call for any needs.

## 2018-01-30 NOTE — DISCHARGE SUMMARY
Discharge Summary:      Lorene Albert      Admit Date:   2018  Discharge Date:  2018     Admitting diagnosis:  Pregnancy  IOL  Indication for care in labor or delivery  Discharge Diagnosis: Status post vaginal, spontaneous.  Pregnancy Complications: IUGR  Tubal Ligation:  no        History:  Past Medical History:   Diagnosis Date   • ADHD (attention deficit hyperactivity disorder) 2012   • marijuana use in pregnancy 2015   • Supervision of normal first teen pregnancy 2015     OB History    Para Term  AB Living   2 1 1     1   SAB TAB Ectopic Molar Multiple Live Births             1      # Outcome Date GA Lbr Avery/2nd Weight Sex Delivery Anes PTL Lv   2 Current            1 Term 08/14/15 41w4d  3.056 kg (6 lb 11.8 oz) M Vag-Spont EPI N LINNETTE      Birth Comments: NBS x2 normal, passed [place] hearing screen. Received Hep B vaccine at birth           Patient has no known allergies.  Patient Active Problem List    Diagnosis Date Noted   • Indication for care in labor or delivery 2018   • IUGR (intrauterine growth retardation) affecting mother, third trimester, fetus 1 2018   • Prenatal care, subsequent pregnancy in third trimester 2018   • Non-intractable vomiting with nausea 2018   • Uterine size date discrepancy pregnancy, third trimester 2018   • ADHD (attention deficit hyperactivity disorder) 2012        Hospital Course:   22 y.o. , now para 2, was admitted with the above mentioned diagnosis, underwent Induction of Labor, vaginal, spontaneous. Patient postpartum course was unremarkable, with progressive advancement in diet , ambulation and toleration of oral analgesia. Patient without complaints today and desires discharge.      Vitals:    18 0926 18 1200 18 1600 18   BP: 117/66 113/72 119/82 108/77   Pulse: 91 81 (!) 102 95   Resp:    Temp: 36.8 °C (98.3 °F) 36.9 °C (98.4 °F) 36.9 °C (98.5 °F) 36.5  °C (97.7 °F)   SpO2: 97% 96% 98% 99%   Weight:       Height:           Current Facility-Administered Medications   Medication Dose   • ondansetron (ZOFRAN ODT) dispertab 4 mg  4 mg    Or   • ondansetron (ZOFRAN) syringe/vial injection 4 mg  4 mg   • oxytocin (PITOCIN) infusion (for postpartum)  2,000 mL/hr    Followed by   • oxytocin (PITOCIN) infusion (for postpartum)   mL/hr   • ibuprofen (MOTRIN) tablet 800 mg  800 mg   • acetaminophen (TYLENOL) tablet 325 mg  325 mg   • oxycodone immediate-release (ROXICODONE) tablet 5 mg  5 mg   • oxycodone immediate release (ROXICODONE) tablet 10 mg  10 mg   • LR infusion     • PRN oxytocin (PITOCIN) (20 Units/1000 mL) PRN for excessive uterine bleeding - See Admin Instr  125-999 mL/hr   • docusate sodium (COLACE) capsule 100 mg  100 mg   • bisacodyl (DULCOLAX) suppository 10 mg  10 mg   • magnesium hydroxide (MILK OF MAGNESIA) suspension 30 mL  30 mL   • prenatal plus vitamin (STUARTNATAL 1+1) 27-1 MG tablet 1 Tab  1 Tab       Exam:  Breast Exam: negative  Abdomen: Abdomen soft, non-tender. BS normal. No masses,  No organomegaly  Fundus Non Tender: yes  Perineum: perineum intact  Extremity: extremities, peripheral pulses and reflexes normal     Labs:  Recent Labs      01/28/18   2300  01/29/18   1443   WBC  12.3*  14.1*   RBC  4.35  3.88*   HEMOGLOBIN  12.2  10.9*   HEMATOCRIT  36.9*  33.2*   MCV  84.8  85.6   MCH  28.0  28.1   MCHC  33.1*  32.8*   RDW  41.5  41.8   PLATELETCT  269  226   MPV  11.2  10.7        Activity:   Discharge to home  Pelvic Rest x 6 weeks    Assessment:  normal postpartum course  Discharge Assessment: No heavy bleeding or foul vaginal discharge      Follow up: .[facility] 6  weeks for vaginal      Discharge Meds:   Current Outpatient Prescriptions   Medication Sig Dispense Refill   • ibuprofen (MOTRIN) 800 MG Tab Take 1 Tab by mouth every 8 hours as needed (For cramping after delivery; do not give if patient is receiving ketorolac (Toradol)). 30 Tab 1    • docusate sodium 100 MG Cap Take 100 mg by mouth 2 times a day as needed for Constipation. 60 Cap 3   • ferrous sulfate 325 (65 Fe) MG EC tablet Take 1 Tab by mouth 3 times a day, with meals. 90 Tab 3       Asia Cooney M.D.

## 2018-01-30 NOTE — CARE PLAN
Problem: Altered physiologic condition related to immediate post-delivery state and potential for bleeding/hemorrhage  Goal: Patient physiologically stable as evidenced by normal lochia, palpable uterine involution and vital signs within normal limits  Outcome: PROGRESSING AS EXPECTED  Fundus firm. Lochia light. Vital signs WDL.

## 2018-01-30 NOTE — CONSULTS
Lactation note:    Initial visit. Infant weight down 2.4%. Infant is IUGR. Discussed breastfeeding infant, and what to watch out for during feedings. Discussed normal course of breastfeeding and what to expect at 12-24-48-72 hours. Encouraged frequent skin to skin, and to continue offering breast every 2-3 hours. Breastfeeding essentials pamphlet given, and reviewed. Plan to continue to offer breast first, to avoid feeds longer than 10 minutes, then to supplement according to appropriate guidelines, using 10-20-30 supplementation.    Discussed discharge feeding plan-   1- To breastfeed first.  2- if latch or breastfeeding is suboptimal, can supplement according to guidelines. (Volumes reviewed per infant birthweight)  3. Use breastpump to protect supply.     Parents are temporarily here in [place], until Mid-February, then they plan to move to [place]. Encouraged to follow up with [facility] for outpatient lactation support, and invited to breastfeeding forums.     Discussed with AIDEE Hall. If infant weight drops more than 3%-7%, then will need to supplement using the ml/kg guidelines.

## 2018-01-30 NOTE — CONSULTS
Baby not finishing volumes of donor milk. Takes 15 ml and small emesis per parents. Will offer smaller volumes and increase as baby tolerates. Baby not interested in latching prior to bottle supplements being given.

## 2018-01-30 NOTE — CARE PLAN
Problem: Altered physiologic condition related to immediate post-delivery state and potential for bleeding/hemorrhage  Goal: Patient physiologically stable as evidenced by normal lochia, palpable uterine involution and vital signs within normal limits  Outcome: PROGRESSING AS EXPECTED  Fundus is firm and one finger breadth below the umbilicus. Lochia is light. Vital signs are within normal limits.     Problem: Alteration in comfort related to episiotomy, vaginal repair and/or after birth pains  Goal: Patient is able to ambulate, care for self and infant  Outcome: PROGRESSING AS EXPECTED  Patient reported pain of 6/10. Administered pain medication as per MAR order. Will continue to assess for pain.

## 2018-01-31 VITALS
HEIGHT: 64 IN | WEIGHT: 185 LBS | DIASTOLIC BLOOD PRESSURE: 82 MMHG | OXYGEN SATURATION: 97 % | BODY MASS INDEX: 31.58 KG/M2 | RESPIRATION RATE: 20 BRPM | SYSTOLIC BLOOD PRESSURE: 117 MMHG | HEART RATE: 100 BPM | TEMPERATURE: 98.3 F

## 2018-01-31 PROCEDURE — 700102 HCHG RX REV CODE 250 W/ 637 OVERRIDE(OP): Performed by: OBSTETRICS & GYNECOLOGY

## 2018-01-31 PROCEDURE — A9270 NON-COVERED ITEM OR SERVICE: HCPCS | Performed by: OBSTETRICS & GYNECOLOGY

## 2018-01-31 RX ADMIN — Medication 1 TABLET: at 09:02

## 2018-01-31 RX ADMIN — IBUPROFEN 800 MG: 800 TABLET, FILM COATED ORAL at 09:02

## 2018-01-31 ASSESSMENT — COPD QUESTIONNAIRES
COPD SCREENING SCORE: 0
DURING THE PAST 4 WEEKS HOW MUCH DID YOU FEEL SHORT OF BREATH: NONE/LITTLE OF THE TIME
DO YOU EVER COUGH UP ANY MUCUS OR PHLEGM?: NO/ONLY WITH OCCASIONAL COLDS OR INFECTIONS
HAVE YOU SMOKED AT LEAST 100 CIGARETTES IN YOUR ENTIRE LIFE: NO/DON'T KNOW

## 2018-01-31 ASSESSMENT — PAIN SCALES - GENERAL
PAINLEVEL_OUTOF10: 0
PAINLEVEL_OUTOF10: 4
PAINLEVEL_OUTOF10: 0
PAINLEVEL_OUTOF10: 0

## 2018-01-31 ASSESSMENT — LIFESTYLE VARIABLES: EVER_SMOKED: NEVER

## 2018-01-31 NOTE — PROGRESS NOTES
1900--Received report from day RN. Infant at bedside in open crib no signs of distress. Pt resting in bed, discussed plan of care and pain management for the night. Pt states she will call staff if requires pain interventions or assistance through the night. PRN medication administered per MAR. No further needs at this time.

## 2018-01-31 NOTE — PROGRESS NOTES
MOB declines assistance with breastfeeding at this time.  Infant just finished feeding.  MOB pumping breasts simultaneously using hospital grade pump.  Denies pain or trauma to nipples from pumping.  Fit of flanges found to be appropriate.  Settings, 80 CPM; Suction 30.  Instructed MOB to decrease CPM to 60 when she has a let down of milk.    MOB has a Spectra 1 personal pump at home.  Discussed the difference between a hospital grade pump and a personal pump.    Continues to feed infant based on 10-20-30 supplemental guidelines.  Breastfeeding plan remains in effect.    Encouraged to call if any assistance is needed.

## 2018-01-31 NOTE — CARE PLAN
Problem: Altered physiologic condition related to immediate post-delivery state and potential for bleeding/hemorrhage  Goal: Patient physiologically stable as evidenced by normal lochia, palpable uterine involution and vital signs within normal limits  Outcome: PROGRESSING AS EXPECTED  Fundus firm, lochia light    Problem: Pain Management  Goal: Pain level will decrease to patient's comfort goal  Outcome: PROGRESSING AS EXPECTED

## 2018-01-31 NOTE — DISCHARGE SUMMARY
Patient was clear for discharge yesterday however stayed an additional day secondary to  needs.  Pt remains VSSAF and DC summary remains the same.  Please see prior DC summary for pt medications and instructions.

## 2018-01-31 NOTE — DISCHARGE PLANNING
:    Infant: Marianne Albert (: 18)    Referral: Infant is positive for marijuana.    Intervention:  Received a referral to see MOB who has a history of marijuana use and infant's UDS is positive for marijuana.  Met with parents: Robbin (95) and Lorene Albert.  This is their second child, parents also have a 2 year old son-Ruben Albert (8/14/15).  Parents live together at [address] [place] [address] [place] [address].  Phone number is [phone].  ARACELI is a  in the [employer] [place] and they will be moving back to [place] in a few weeks.  Parents state they are prepared for the baby and deny any needs.  Their pediatrician is [facility].        Discussed marijuana use and MOB admits to using to help with nausea.  She stated she was prescribed Zofran but it didn't help her like marijuana did.  She stated she only used occasionally.  MOB does not have a medical card.  Discussed that a report will be made to [facility].  Contacted CPS and reported information to Olegario Yañez.    Plan:  [facility].  Infant is cleared to discharge home with parents.

## 2018-01-31 NOTE — DISCHARGE INSTRUCTIONS
POSTPARTUM DISCHARGE INSTRUCTIONS FOR MOM    YOB: 1995   Age: 22 y.o.               Admit Date: 2018     Discharge Date: 2018  Attending Doctor:  Sulma Shen M.D.                  Allergies:  Patient has no known allergies.    Discharged to home by car. Discharged via wheelchair, hospital escort: Yes.  Special equipment needed: Not Applicable  Belongings with: Personal  Be sure to schedule a follow-up appointment with your primary care doctor or any specialists as instructed.     Discharge Plan:   Diet Plan: Discussed  Activity Level: Discussed  Confirmed Follow up Appointment: Patient to Call and Schedule Appointment  Confirmed Symptoms Management: Discussed  Medication Reconciliation Updated: Yes  Influenza Vaccine Indication: Patient Refuses    REASONS TO CALL YOUR OBSTETRICIAN:  1.   Persistent fever or shaking chills (Temperature higher than 100.4)  2.   Heavy bleeding (soaking more than 1 pad per hour); Passing clots  3.   Foul odor from vagina  4.   Mastitis (Breast infection; breast pain, chills, fever, redness)  5.   Urinary pain, burning or frequency  6.   Episiotomy infection  7.   Abdominal incision infection  8.   Severe depression longer than 24 hours    HAND WASHING  · Prior to handling the baby.  · Before breastfeeding or bottle feeding baby.  · After using the bathroom or changing the baby's diaper.    WOUND CARE  Ask your physician for additional care instructions.  In general:    ·  Incision:      · Keep clean and dry.    · Do NOT lift anything heavier than your baby for up to 6 weeks.    · There should not be any opening or pus.      VAGINAL CARE  · Nothing inside vagina for 6 weeks: no sexual intercourse, tampons or douching.  · Bleeding may continue for 2-4 weeks.  Amount may vary.    · Call your physician for heavy bleeding which means soaking more than 1 pad per hour    BIRTH CONTROL  · It is possible to become pregnant at any time after delivery and while  "breastfeeding.  · Plan to discuss a method of birth control with your physician at your follow up visit. visit.    DIET AND ELIMINATION  · Eating more fiber (bran cereal, fruits, and vegetables) and drinking plenty of fluids will help to avoid constipation.  · Urinary frequency after childbirth is normal.    POSTPARTUM BLUES  During the first few days after birth, you may experience a sense of the \"blues\" which may include impatience, irritability or even crying.  These feeling come and go quickly.  However, as many as 1 in 10 women experience emotional symptoms known as postpartum depression.    Postpartum depression:  May start as early as the second or third day after delivery or take several weeks or months to develop.  Symptoms of \"blues\" are present, but are more intense:  Crying spells; loss of appetite; feelings of hopelessness or loss of control; fear of touching the baby; over concern or no concern at all about the baby; little or no concern about your own appearance/caring for yourself; and/or inability to sleep or excessive sleeping.  Contact your physician if you are experiencing any of these symptoms.    Crisis Hotline:  · [place] Crisis Hotline:  3-849-OAAVYHP  Or 1-516.281.8583  · [place] Crisis Hotline:  1-261.828.3840  Or 301-004-5573    PREVENTING SHAKEN BABY:  If you are angry or stressed, PUT THE BABY IN THE CRIB, step away, take some deep breaths, and wait until you are calm to care for the baby.  DO NOT SHAKE THE BABY.  You are not alone, call a supporter for help.    · Crisis Call Center 24/7 crisis line 168-100-4501 or 1-423.177.6533  · You can also text them, text \"ANSWER\" to 260418    QUIT SMOKING/TOBACCO USE:  I understand the use of any tobacco products increases my chance of suffering from future heart disease and could cause other illnesses which may shorten my life. Quitting the use of tobacco products is the single most important thing I can do to improve my health. For further " information on smoking / tobacco cessation call a Toll Free Quit Line at 1-515.193.5730 (*[employer] [place]) or 1-622.779.7226 (American [employer]) or you can access the web based program at www.lungusa.org.    · [place] Tobacco Users Help Line:  (189) 207-3647       Toll Free: 1-110.991.2411  · Quit Tobacco Program [facility] (508)752-8221    DEPRESSION / SUICIDE RISK:  As you are discharged from this [facility], it is important to learn how to keep safe from harming yourself.    Recognize the warning signs:  · Abrupt changes in personality, positive or negative- including increase in energy   · Giving away possessions  · Change in eating patterns- significant weight changes-  positive or negative  · Change in sleeping patterns- unable to sleep or sleeping all the time   · Unwillingness or inability to communicate  · Depression  · Unusual sadness, discouragement and loneliness  · Talk of wanting to die  · Neglect of personal appearance   · Rebelliousness- reckless behavior  · Withdrawal from people/activities they love  · Confusion- inability to concentrate     If you or a loved one observes any of these behaviors or has concerns about self-harm, here's what you can do:  · Talk about it- your feelings and reasons for harming yourself  · Remove any means that you might use to hurt yourself (examples: pills, rope, extension cords, firearm)  · Get professional help from the community (Mental Health, Substance Abuse, psychological counseling)  · Do not be alone:Call your Safe Contact- someone whom you trust who will be there for you.  · Call your local CRISIS HOTLINE 085-2087 or 253-949-1298  · Call your local Children's Mobile Crisis Response Team [place] (036) 192-6500 or www.MyWerx  · Call the toll free National Suicide Prevention Hotlines   · National Suicide Prevention Lifeline 987-665-QIRS (0889)  · National Hope Line Network 800-SUICIDE  (254-5402)    DISCHARGE SURVEY:  [facility]  We hope we provided you with very good care.  You may be receiving a survey in the mail.  Please fill it out.  Your opinion is valuable to us.    ADDITIONAL EDUCATIONAL MATERIALS GIVEN TO PATIENT:        My signature on this form indicates that:  1.  I have reviewed and understand the above information  2.  My questions regarding this information have been answered to my satisfaction.  3.  I have formulated a plan with my discharge nurse to obtain my prescribed medication for home.

## 2018-01-31 NOTE — CONSULTS
Discussed plan with mother for pumping and feeding at home. Enc to continue with paced bottle feeding. Discussed suck training with parents and then transferring to breast after a suck pattern is established on finger. Enc to avoid pacifiers for a few weeks to help baby get to breast, enc frequent skin to skin care while mom and dad are awake. Pt given info for post d/c resources in [place] while she is here visiting.

## 2018-01-31 NOTE — CARE PLAN
Problem: Altered physiologic condition related to immediate post-delivery state and potential for bleeding/hemorrhage  Goal: Patient physiologically stable as evidenced by normal lochia, palpable uterine involution and vital signs within normal limits  Outcome: PROGRESSING AS EXPECTED  Fundus is firm and 2 finger breadth below the umbilicus. Lochia is light. Vital signs are within normal limits.     Problem: Alteration in comfort related to episiotomy, vaginal repair and/or after birth pains  Goal: Patient is able to ambulate, care for self and infant  Outcome: PROGRESSING AS EXPECTED  Patient reported pain of 5/10. Administered pain medication as per MAR order. Will continue to assess for pain.

## 2024-03-06 ENCOUNTER — NON-PROVIDER VISIT (OUTPATIENT)
Dept: URGENT CARE | Facility: PHYSICIAN GROUP | Age: 29
End: 2024-03-06

## 2024-03-06 DIAGNOSIS — Z11.1 ENCOUNTER FOR PPD TEST: ICD-10-CM

## 2024-03-06 PROCEDURE — 86580 TB INTRADERMAL TEST: CPT | Performed by: STUDENT IN AN ORGANIZED HEALTH CARE EDUCATION/TRAINING PROGRAM

## 2024-03-06 NOTE — PROGRESS NOTES
Lorene Albert is a 28 y.o. female here for a non-provider visit for PPD placement -- Step 1 of 1    Reason for PPD:  work requirement    1. TB evaluation questionnaire completed by patient? Yes      -  If any answers marked yes did you contact a provider prior to placing? Not Indicated  2.  Patient notified to return to clinic for reading on: 3/8  3.  PPD Placement documentation completed on TB evaluation questionnaire? Yes  4.  Location of TB evaluation questionnaire filed: folder

## 2024-04-01 ENCOUNTER — NON-PROVIDER VISIT (OUTPATIENT)
Dept: URGENT CARE | Facility: PHYSICIAN GROUP | Age: 29
End: 2024-04-01

## 2024-04-01 DIAGNOSIS — Z02.1 PRE-EMPLOYMENT DRUG SCREENING: ICD-10-CM

## 2024-04-01 LAB
AMP AMPHETAMINE: NORMAL
COC COCAINE: NORMAL
INT CON NEG: NORMAL
INT CON POS: NORMAL
MET METHAMPHETAMINES: NORMAL
OPI OPIATES: NORMAL
PCP PHENCYCLIDINE: NORMAL
POC DRUG COMMENT 753798-OCCUPATIONAL HEALTH: NORMAL
THC: NORMAL

## 2024-04-01 PROCEDURE — 80305 DRUG TEST PRSMV DIR OPT OBS: CPT | Performed by: PHYSICIAN ASSISTANT
